# Patient Record
Sex: FEMALE | Race: WHITE | Employment: FULL TIME | URBAN - NONMETROPOLITAN AREA
[De-identification: names, ages, dates, MRNs, and addresses within clinical notes are randomized per-mention and may not be internally consistent; named-entity substitution may affect disease eponyms.]

---

## 2018-07-05 ENCOUNTER — OFFICE VISIT (OUTPATIENT)
Dept: PRIMARY CARE CLINIC | Age: 28
End: 2018-07-05
Payer: COMMERCIAL

## 2018-07-05 VITALS
WEIGHT: 145 LBS | DIASTOLIC BLOOD PRESSURE: 70 MMHG | SYSTOLIC BLOOD PRESSURE: 110 MMHG | HEIGHT: 66 IN | HEART RATE: 94 BPM | BODY MASS INDEX: 23.3 KG/M2 | TEMPERATURE: 97.9 F | OXYGEN SATURATION: 98 %
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
## 2018-07-05 DIAGNOSIS — L03.113 CELLULITIS OF ARM, RIGHT: Primary | ICD-10-CM

## 2018-07-05 DIAGNOSIS — S50.861A INSECT BITE OF RIGHT FOREARM, INITIAL ENCOUNTER: ICD-10-CM

## 2018-07-05 PROCEDURE — 99213 OFFICE O/P EST LOW 20 MIN: CPT | Performed by: NURSE PRACTITIONER

## 2018-07-05 RX ORDER — CLINDAMYCIN HYDROCHLORIDE 300 MG/1
300 CAPSULE ORAL 3 TIMES DAILY
Qty: 30 CAPSULE | Refills: 0 | Status: SHIPPED | OUTPATIENT
Start: 2018-07-05 | End: 2018-07-15

## 2018-07-05 RX ORDER — PREDNISONE 10 MG/1
10 TABLET ORAL DAILY
Qty: 7 TABLET | Refills: 0 | Status: SHIPPED | OUTPATIENT
Start: 2018-07-05 | End: 2018-07-12

## 2018-07-05 ASSESSMENT — ENCOUNTER SYMPTOMS: RESPIRATORY NEGATIVE: 1

## 2018-07-05 NOTE — PROGRESS NOTES
Subjective:      Patient ID: Kelly Saleem is a 28 y.o. female. Rash   This is a new problem. The current episode started in the past 7 days. The problem has been gradually worsening since onset. The affected locations include the right arm. The rash is characterized by redness and itchiness. Treatments tried: clindamycin. The treatment provided mild relief. Review of Systems   Constitutional: Negative. Respiratory: Negative. Cardiovascular: Negative. Musculoskeletal: Negative. Skin: Positive for rash and wound. Objective:   Physical Exam   Constitutional: She is oriented to person, place, and time. She appears well-developed. HENT:   Head: Normocephalic and atraumatic. Right Ear: Tympanic membrane, external ear and ear canal normal.   Left Ear: Tympanic membrane, external ear and ear canal normal.   Nose: Nose normal.   Mouth/Throat: Uvula is midline, oropharynx is clear and moist and mucous membranes are normal.   Eyes: Conjunctivae, EOM and lids are normal. Pupils are equal, round, and reactive to light. Neck: Trachea normal and normal range of motion. Cardiovascular: Normal rate, regular rhythm, normal heart sounds and normal pulses. Pulmonary/Chest: Effort normal and breath sounds normal.   Musculoskeletal: Normal range of motion. Neurological: She is alert and oriented to person, place, and time. Skin: Skin is warm, dry and intact. Lesion noted. There is erythema. Vitals:    07/05/18 1756   BP: 110/70   Pulse: 94   Temp: 97.9 °F (36.6 °C)   SpO2: 98%     I have reviewed pertinent family and social history. Assessment:       Diagnosis Orders   1. Cellulitis of arm, right  clindamycin (CLEOCIN) 300 MG capsule    predniSONE (DELTASONE) 10 MG tablet   2. Insect bite of right forearm, initial encounter  clindamycin (CLEOCIN) 300 MG capsule    predniSONE (DELTASONE) 10 MG tablet           Plan:      Epsom salt soaks. Warm compresses.   Use Bactroban topically

## 2018-07-05 NOTE — PATIENT INSTRUCTIONS
scrapes, or other injuries to your skin. Cellulitis most often occurs where there is a break in the skin. · If you get a scrape, cut, mild burn, or bite, wash the wound with clean water as soon as you can to help avoid infection. Don't use hydrogen peroxide or alcohol, which can slow healing. · If you have swelling in your legs (edema), support stockings and good skin care may help prevent leg sores and cellulitis. · Take care of your feet, especially if you have diabetes or other conditions that increase the risk of infection. Wear shoes and socks. Do not go barefoot. If you have athlete's foot or other skin problems on your feet, talk to your doctor about how to treat them. When should you call for help? Call your doctor now or seek immediate medical care if:    · You have signs that your infection is getting worse, such as:  ¨ Increased pain, swelling, warmth, or redness. ¨ Red streaks leading from the area. ¨ Pus draining from the area. ¨ A fever.     · You get a rash.    Watch closely for changes in your health, and be sure to contact your doctor if:    · You do not get better as expected. Where can you learn more? Go to https://[employer]. org and sign in to your [employer] account. Enter Y796 in the [facility] box to learn more about \"Cellulitis: Care Instructions. \"     If you do not have an account, please click on the \"Sign Up Now\" link. Current as of: May 10, 2017  Content Version: 11.6  © 3273-2519 [employer]. Care instructions adapted under license by [facility] ([facility]). If you have questions about a medical condition or this instruction, always ask your healthcare professional. [place] [address] any warranty or liability for your use of this information.        Patient Education        Insect Stings and Bites: Care Instructions  Your Care Instructions  Stings and bites from bees, wasps, ants, and other insects often cause pain, swelling, redness, and itching. In some people, especially children, the redness and swelling may be worse. It may extend several inches beyond the affected area. But in most cases, stings and bites don't cause reactions all over the body. If you have had a reaction to an insect sting or bite, you are at risk for a reaction if you get stung or bitten again. Follow-up care is a key part of your treatment and safety. Be sure to make and go to all appointments, and call your doctor if you are having problems. It's also a good idea to know your test results and keep a list of the medicines you take. How can you care for yourself at home? · Do not scratch or rub the skin where the sting or bite occurred. · Put a cold pack or ice cube on the area. Put a thin cloth between the ice and your skin. For some people, a paste of baking soda mixed with a little water helps relieve pain and decrease the reaction. · Take an over-the-counter antihistamine, such as diphenhydramine (Benadryl) or loratadine (Claritin), to relieve swelling, redness, and itching. Calamine lotion or hydrocortisone cream may also help. Do not give antihistamines to your child unless you have checked with the doctor first.  · Be safe with medicines. If your doctor prescribed medicine for your allergy, take it exactly as prescribed. Call your doctor if you think you are having a problem with your medicine. You will get more details on the specific medicines your doctor prescribes. · Your doctor may prescribe a shot of epinephrine to carry with you in case you have a severe reaction. Learn how and when to give yourself the shot, and keep it with you at all times. Make sure it has not . · Go to the emergency room anytime you have a severe reaction. Go even if you have given yourself epinephrine and are feeling better. Symptoms can come back. When should you call for help? Call 911 anytime you think you may need emergency care.  For example, call if:

## 2018-07-09 ENCOUNTER — TELEPHONE (OUTPATIENT)
Dept: FAMILY MEDICINE CLINIC | Age: 28
End: 2018-07-09

## 2018-07-09 NOTE — TELEPHONE ENCOUNTER
Not usure if she was at [employer] during that time but is a huge possbility and is requesting any labs that may verify before she goes and gets any immunizations.  Stated that they had a family cookout this past weekend with all her nieces and nephews    Please advise

## 2018-07-10 NOTE — TELEPHONE ENCOUNTER
The only labs that can verify exposure to Hep A would not show up as positive until an exposed individual was acutely ill. That would be too late to have the immunization. There is no other testing that is recommended. If she was possibly exposed I would strongly recommend the immunization. She is very unlikely to have infected any other family members if she herself is not ill even if she had been exposed.

## 2018-09-06 ENCOUNTER — OFFICE VISIT (OUTPATIENT)
Dept: FAMILY MEDICINE CLINIC | Age: 28
End: 2018-09-06
Payer: COMMERCIAL

## 2018-09-06 VITALS
HEART RATE: 80 BPM | SYSTOLIC BLOOD PRESSURE: 104 MMHG | DIASTOLIC BLOOD PRESSURE: 62 MMHG | BODY MASS INDEX: 22.92 KG/M2 | WEIGHT: 142 LBS

## 2018-09-06 DIAGNOSIS — Z13.1 ENCOUNTER FOR SCREENING EXAMINATION FOR IMPAIRED GLUCOSE REGULATION AND DIABETES MELLITUS: ICD-10-CM

## 2018-09-06 DIAGNOSIS — Z13.220 SCREENING FOR LIPID DISORDERS: ICD-10-CM

## 2018-09-06 DIAGNOSIS — Z00.00 WELL ADULT EXAM: Primary | ICD-10-CM

## 2018-09-06 LAB
CHOLESTEROL/HDL RATIO: 2.4 RATIO
CHOLESTEROL: 182 MG/DL
GLUCOSE: 88 MG/DL
HDL, DIRECT: 75 MG/DL
LDL CHOLESTEROL CALCULATED: 89.6 MG/DL
TRIGL SERPL-MCNC: 87 MG/DL
VLDLC SERPL CALC-MCNC: 17 MG/DL

## 2018-09-06 PROCEDURE — 99395 PREV VISIT EST AGE 18-39: CPT | Performed by: FAMILY MEDICINE

## 2018-09-06 ASSESSMENT — PATIENT HEALTH QUESTIONNAIRE - PHQ9
2. FEELING DOWN, DEPRESSED OR HOPELESS: 0
1. LITTLE INTEREST OR PLEASURE IN DOING THINGS: 0
SUM OF ALL RESPONSES TO PHQ QUESTIONS 1-9: 0
SUM OF ALL RESPONSES TO PHQ QUESTIONS 1-9: 0
SUM OF ALL RESPONSES TO PHQ9 QUESTIONS 1 & 2: 0

## 2018-09-06 NOTE — PROGRESS NOTES
11/07/2004    DTaP/Tdap/Td vaccine (1 - Tdap) 11/07/2008    Cervical cancer screen  11/07/2010    Flu vaccine (1) 09/01/2018       Subjective:      Review of Systems    Objective:     /62   Pulse 80   Wt 142 lb (64.4 kg)   BMI 22.92 kg/m²     Physical Exam   Constitutional: She is oriented to person, place, and time. She appears well-developed and well-nourished. HENT:   Head: Normocephalic and atraumatic. Eyes: Conjunctivae and EOM are normal.   Neck: Normal range of motion. Neck supple. No JVD present. No thyromegaly present. Cardiovascular: Normal rate, regular rhythm and intact distal pulses. Exam reveals no gallop and no friction rub. No murmur heard. Pulmonary/Chest: Effort normal and breath sounds normal. No respiratory distress. Abdominal: Soft. She exhibits no distension and no mass. There is no tenderness. There is no rebound and no guarding. Musculoskeletal: She exhibits no edema or deformity. Lymphadenopathy:     She has no cervical adenopathy. Neurological: She is alert and oriented to person, place, and time. Skin: Skin is warm. Psychiatric: She has a normal mood and affect. Her behavior is normal. Judgment and thought content normal.   Nursing note and vitals reviewed. Assessment/Plan:      Diagnosis Orders   1. Well adult exam     2. Screening for lipid disorders  Lipid Panel   3. Encounter for screening examination for impaired glucose regulation and diabetes mellitus  Glucose, Fasting     Reviewed routine health maintenance today. Encouraged to continue on her diet and exercise. Sunscreen seatbelts and home safety also reviewed today. Encouraged adequate dietary calcium with supplementation if not able to achieve. Return in about 1 year (around 9/6/2019). Patient given educational materials - see patient instructions. Discussed use, benefit, and side effects of prescribed medications. All patient questions answered. Pt voiced understanding.

## 2020-05-06 LAB — GYNECOLOGY CYTOLOGY REPORT: NORMAL

## 2020-10-08 ENCOUNTER — HOSPITAL ENCOUNTER (OUTPATIENT)
Age: 30
Setting detail: SPECIMEN
Discharge: HOME | End: 2020-10-08
Payer: COMMERCIAL

## 2020-10-08 ENCOUNTER — OFFICE VISIT (OUTPATIENT)
Dept: FAMILY MEDICINE CLINIC | Age: 30
End: 2020-10-08
Payer: COMMERCIAL

## 2020-10-08 VITALS
HEART RATE: 97 BPM | DIASTOLIC BLOOD PRESSURE: 60 MMHG | WEIGHT: 160 LBS | SYSTOLIC BLOOD PRESSURE: 104 MMHG | OXYGEN SATURATION: 97 % | BODY MASS INDEX: 25.11 KG/M2 | HEIGHT: 67 IN

## 2020-10-08 LAB — GLUCOSE FASTING: 85 MG/DL (ref 70–99)

## 2020-10-08 PROCEDURE — 82947 ASSAY GLUCOSE BLOOD QUANT: CPT

## 2020-10-08 PROCEDURE — 99395 PREV VISIT EST AGE 18-39: CPT | Performed by: FAMILY MEDICINE

## 2020-10-08 PROCEDURE — 36415 COLL VENOUS BLD VENIPUNCTURE: CPT

## 2020-10-08 PROCEDURE — 80061 LIPID PANEL: CPT

## 2020-10-08 RX ORDER — VITAMIN A, VITAMIN C, VITAMIN D-3, VITAMIN E, VITAMIN B-1, VITAMIN B-2, NIACIN, VITAMIN B-6, CALCIUM, IRON, ZINC, COPPER 4000; 120; 400; 22; 1.84; 3; 20; 10; 1; 12; 200; 27; 25; 2 [IU]/1; MG/1; [IU]/1; MG/1; MG/1; MG/1; MG/1; MG/1; MG/1; UG/1; MG/1; MG/1; MG/1; MG/1
1 TABLET ORAL DAILY
COMMUNITY
Start: 2020-09-29

## 2020-10-08 ASSESSMENT — PATIENT HEALTH QUESTIONNAIRE - PHQ9
SUM OF ALL RESPONSES TO PHQ9 QUESTIONS 1 & 2: 0
1. LITTLE INTEREST OR PLEASURE IN DOING THINGS: 0
SUM OF ALL RESPONSES TO PHQ QUESTIONS 1-9: 0
2. FEELING DOWN, DEPRESSED OR HOPELESS: 0
SUM OF ALL RESPONSES TO PHQ QUESTIONS 1-9: 0

## 2020-10-08 NOTE — PROGRESS NOTES
[address] [place] [address] [address]  Dept: [phone]  Dept KD:[phone]    Maurice Hilliard is a 26 y.o. female who presents today for her medical conditions/complaints as notedbelow. Maurice Hilliard is c/o of Annual Exam (physical for insurnace)      HPI:     HPI  Feels good. Has no significant concerns. Does exercise regularly. She is watching her diet regularly. She is working from home most of the time. She is only taking prenatal vitamin at this time as she and her  are attempting to conceive a child. Her immunizations are up-to-date. Family history reviewed and updated also. BP Readings from Last 3 Encounters:   10/08/20 104/60   09/06/18 104/62   07/05/18 110/70          (goal 120/80)    Wt Readings from Last 3 Encounters:   10/08/20 160 lb (72.6 kg)   09/06/18 142 lb (64.4 kg)   07/05/18 145 lb (65.8 kg)        Past Medical History:   Diagnosis Date    History of UTI     sees Dr Aba Roy      Past Surgical History:   Procedure Laterality Date    TONSILLECTOMY         Family History   Problem Relation Age of Onset    Other Mother         partial hysterectomy - does not know why    Other Father         heart issues - SVT    Breast Cancer Maternal Grandmother     Cancer Maternal Grandmother         skin    Other Paternal Grandmother         two valves replaced    Atrial Fibrillation Paternal Grandfather        Social History     Tobacco Use    Smoking status: Never Smoker    Smokeless tobacco: Never Used   Substance Use Topics    Alcohol use: Yes     Alcohol/week: 0.0 standard drinks     Comment: socially      Current Outpatient Medications   Medication Sig Dispense Refill    Prenatal Vit-Fe Fumarate-FA (PRENATAL VITAMIN PLUS LOW IRON) 27-1 MG TABS Take 1 tablet by mouth daily       No current facility-administered medications for this visit.       No Known Allergies    Health Maintenance   Topic Date Due    HIV screen 11/07/2004    DTaP/Tdap/Td vaccine (7 - Td) 03/28/2018    Flu vaccine (1) 09/01/2020    Cervical cancer screen  04/30/2023    Hepatitis B vaccine  Completed    Hib vaccine  Completed    Meningococcal (ACWY) vaccine  Completed    Hepatitis A vaccine  Aged Out    Pneumococcal 0-64 years Vaccine  Aged Out    Varicella vaccine  Discontinued       Subjective:      Review of Systems    Objective:     /60 (Site: Left Upper Arm, Position: Sitting, Cuff Size: Medium Adult)   Pulse 97   Ht 5' 6.75\" (1.695 m)   Wt 160 lb (72.6 kg)   LMP 10/04/2020   SpO2 97%   BMI 25.25 kg/m²     Physical Exam  Vitals signs and nursing note reviewed. Constitutional:       Appearance: She is well-developed. HENT:      Head: Normocephalic and atraumatic. Right Ear: Tympanic membrane normal.      Left Ear: Tympanic membrane normal.      Nose: Nose normal.      Mouth/Throat:      Mouth: Mucous membranes are moist.   Eyes:      Conjunctiva/sclera: Conjunctivae normal.   Neck:      Musculoskeletal: Normal range of motion and neck supple. Thyroid: No thyromegaly. Vascular: No JVD. Cardiovascular:      Rate and Rhythm: Normal rate and regular rhythm. Heart sounds: No murmur. No friction rub. No gallop. Pulmonary:      Effort: Pulmonary effort is normal. No respiratory distress. Breath sounds: Normal breath sounds. Abdominal:      Palpations: Abdomen is soft. Lymphadenopathy:      Cervical: No cervical adenopathy. Skin:     General: Skin is warm. Neurological:      Mental Status: She is alert and oriented to person, place, and time. Psychiatric:         Mood and Affect: Mood normal.         Behavior: Behavior normal.         Thought Content: Thought content normal.         Judgment: Judgment normal.         Assessment/Plan:      Diagnosis Orders   1. Well adult exam     2. Encounter for screening examination for impaired glucose regulation and diabetes mellitus  Glucose, Fasting   3. Screening, lipid  Lipid Panel     Reviewed routine health maintenance today. Encouraged to continue on her diet and exercise. Sunscreen seatbelts and home safety also reviewed today. Encouraged adequate dietary calcium with supplementation if not able to achieve. Lab Results   Component Value Date    CHOL 205 (H) 10/08/2020    TRIG 97 10/08/2020    HDL 65 10/08/2020    GLUF 85 10/08/2020       Return in about 1 year (around 10/8/2021) for Well adult. Patient given educational materials - see patientinstructions. Discussed use, benefit, and side effects of prescribed medications. All patient questions answered. Pt voiced understanding. Reviewed health maintenance. Instructed to continue current medications, diet andexercise. Patient agreed with treatment plan. Follow up as directed.      (Please note that portions of this note were completed with a voice-recognition program. Efforts were made to edit the dictation but occasionally words are mis-transcribed.)    Electronically signed by Shefali George MD on 10/11/2020

## 2020-10-09 LAB
CHOLESTEROL/HDL RATIO: 3.2
CHOLESTEROL: 205 MG/DL
HDLC SERPL-MCNC: 65 MG/DL
LDL CHOLESTEROL: 121 MG/DL (ref 0–130)
TRIGL SERPL-MCNC: 97 MG/DL
VLDLC SERPL CALC-MCNC: ABNORMAL MG/DL (ref 1–30)

## 2021-04-06 LAB — HCG QUANTITATIVE: 152 MUNIT/ML (ref 0–5)

## 2021-04-08 LAB — HCG QUANTITATIVE: 76.7 MUNIT/ML (ref 0–5)

## 2021-04-15 LAB — HCG QUANTITATIVE: 32.8 MUNIT/ML (ref 0–5)

## 2021-05-03 LAB
HCG QUALITATIVE: POSITIVE
HCG QUANTITATIVE: 24.6 MUNIT/ML (ref 0–5)
PROLACTIN: 18.12 UG/L (ref 4.79–23.3)
TSH REFLEX FT4: 1.53 MIU/ML (ref 0.49–4.67)

## 2021-05-10 LAB — HCG QUANTITATIVE: 10.5 MUNIT/ML (ref 0–5)

## 2021-05-17 LAB — HCG QUANTITATIVE: 5.4 MUNIT/ML (ref 0–5)

## 2022-07-12 ENCOUNTER — TELEPHONE (OUTPATIENT)
Dept: FAMILY MEDICINE CLINIC | Age: 32
End: 2022-07-12

## 2022-07-12 NOTE — TELEPHONE ENCOUNTER
Patient notified yes can schedule  appointment. Advised to call office with baby is born and we will schedule appointment.

## 2022-07-12 NOTE — TELEPHONE ENCOUNTER
----- Message from Grace Montalvo sent at 2022  4:39 PM EDT -----  Subject: Message to Provider    QUESTIONS  Information for Provider? Pt is calling to see if Dr. Sosa Jara would take   her  on as a new pt-she is due tin August. Please contact pt.   ---------------------------------------------------------------------------  --------------  Melinda GARCIA  [phone]; OK to leave message on voicemail  ---------------------------------------------------------------------------  --------------  SCRIPT ANSWERS  Relationship to Patient?  Self

## 2022-08-19 LAB
ALBUMIN/GLOBULIN RATIO: 1.1 G/DL
ALBUMIN: 3.7 G/DL (ref 3.5–5)
ALP BLD-CCNC: 173 UNITS/L (ref 38–126)
ALT SERPL-CCNC: 285 UNITS/L (ref 4–35)
ANION GAP SERPL CALCULATED.3IONS-SCNC: 9.1 MMOL/L
AST SERPL-CCNC: 274 UNITS/L (ref 14–36)
BASOPHILS %: 0.87 (ref 0–3)
BASOPHILS ABSOLUTE: 0.13 (ref 0–0.3)
BILIRUB SERPL-MCNC: 0.4 MG/DL (ref 0.2–1.3)
BUN BLDV-MCNC: 12 MG/DL (ref 7–17)
CALCIUM SERPL-MCNC: 9.2 MG/DL (ref 8.4–10.2)
CHLORIDE BLD-SCNC: 105 MMOL/L (ref 98–120)
CO2: 23 MMOL/L (ref 22–31)
CREAT SERPL-MCNC: 0.6 MG/DL (ref 0.5–1)
EOSINOPHILS %: 0.12 (ref 0–10)
EOSINOPHILS ABSOLUTE: 0.02 (ref 0–1.1)
GFR CALCULATED: > 60
GLOBULIN: 3.4 G/DL
GLUCOSE: 124 MG/DL (ref 65–105)
HCT VFR BLD CALC: 37.1 % (ref 37–47)
HEMOGLOBIN: 11.3 (ref 12–16)
LACTATE DEHYDROGENASE: 913 UNITS/L (ref 313–618)
LYMPHOCYTE %: 12.64 (ref 20–51.1)
LYMPHOCYTES ABSOLUTE: 1.85 (ref 1–5.5)
MCH RBC QN AUTO: 29.5 PG (ref 28.5–32.5)
MCHC RBC AUTO-ENTMCNC: 30.5 G/DL (ref 32–37)
MCV RBC AUTO: 96.8 FL (ref 80–94)
MONOCYTES %: 3.89 (ref 1.7–9.3)
MONOCYTES ABSOLUTE: 0.57 (ref 0.1–1)
NEUTROPHILS %: 82.48 (ref 42.2–75.2)
NEUTROPHILS ABSOLUTE: 12.06 (ref 2–8.1)
PDW BLD-RTO: 12 % (ref 8.5–15.5)
PLATELET # BLD: 120.3 THOU/MM3 (ref 130–400)
POTASSIUM SERPL-SCNC: 4.1 MMOL/L (ref 3.6–5)
RBC: 3.83 M/UL (ref 4.2–5.4)
SODIUM BLD-SCNC: 133 MMOL/L (ref 135–145)
TOTAL PROTEIN, SERUM: 7.1 G/DL (ref 6.3–8.2)
URIC ACID: 4.7 MG/DL (ref 3.5–8.5)
WBC: 14.6 THOU/ML3 (ref 4.8–10.8)

## 2022-08-26 ENCOUNTER — NURSE ONLY (OUTPATIENT)
Dept: FAMILY MEDICINE CLINIC | Age: 32
End: 2022-08-26

## 2022-08-26 VITALS — SYSTOLIC BLOOD PRESSURE: 138 MMHG | DIASTOLIC BLOOD PRESSURE: 88 MMHG

## 2022-08-26 DIAGNOSIS — I10 HYPERTENSION, UNSPECIFIED TYPE: Primary | ICD-10-CM

## 2022-08-26 PROCEDURE — 99211 OFF/OP EST MAY X REQ PHY/QHP: CPT | Performed by: FAMILY MEDICINE

## 2022-10-03 ENCOUNTER — OFFICE VISIT (OUTPATIENT)
Dept: FAMILY MEDICINE CLINIC | Age: 32
End: 2022-10-03
Payer: COMMERCIAL

## 2022-10-03 VITALS
HEART RATE: 82 BPM | HEIGHT: 67 IN | SYSTOLIC BLOOD PRESSURE: 98 MMHG | WEIGHT: 155 LBS | DIASTOLIC BLOOD PRESSURE: 62 MMHG | BODY MASS INDEX: 24.33 KG/M2 | OXYGEN SATURATION: 98 %

## 2022-10-03 DIAGNOSIS — B07.0 PLANTAR WART: Primary | ICD-10-CM

## 2022-10-03 PROCEDURE — 17110 DESTRUCTION B9 LES UP TO 14: CPT | Performed by: FAMILY MEDICINE

## 2022-10-03 PROCEDURE — 99213 OFFICE O/P EST LOW 20 MIN: CPT | Performed by: FAMILY MEDICINE

## 2022-10-03 RX ORDER — LIDOCAINE HYDROCHLORIDE 10 MG/ML
2 INJECTION, SOLUTION INFILTRATION; PERINEURAL ONCE
Status: COMPLETED | OUTPATIENT
Start: 2022-10-03 | End: 2022-10-03

## 2022-10-03 RX ADMIN — LIDOCAINE HYDROCHLORIDE 2 ML: 10 INJECTION, SOLUTION INFILTRATION; PERINEURAL at 15:23

## 2022-10-03 SDOH — ECONOMIC STABILITY: FOOD INSECURITY: WITHIN THE PAST 12 MONTHS, YOU WORRIED THAT YOUR FOOD WOULD RUN OUT BEFORE YOU GOT MONEY TO BUY MORE.: NEVER TRUE

## 2022-10-03 SDOH — ECONOMIC STABILITY: TRANSPORTATION INSECURITY
IN THE PAST 12 MONTHS, HAS THE LACK OF TRANSPORTATION KEPT YOU FROM MEDICAL APPOINTMENTS OR FROM GETTING MEDICATIONS?: NO

## 2022-10-03 SDOH — ECONOMIC STABILITY: FOOD INSECURITY: WITHIN THE PAST 12 MONTHS, THE FOOD YOU BOUGHT JUST DIDN'T LAST AND YOU DIDN'T HAVE MONEY TO GET MORE.: NEVER TRUE

## 2022-10-03 SDOH — ECONOMIC STABILITY: TRANSPORTATION INSECURITY
IN THE PAST 12 MONTHS, HAS LACK OF TRANSPORTATION KEPT YOU FROM MEETINGS, WORK, OR FROM GETTING THINGS NEEDED FOR DAILY LIVING?: NO

## 2022-10-03 ASSESSMENT — PATIENT HEALTH QUESTIONNAIRE - PHQ9
SUM OF ALL RESPONSES TO PHQ QUESTIONS 1-9: 0
SUM OF ALL RESPONSES TO PHQ9 QUESTIONS 1 & 2: 0
SUM OF ALL RESPONSES TO PHQ QUESTIONS 1-9: 0
1. LITTLE INTEREST OR PLEASURE IN DOING THINGS: 0
SUM OF ALL RESPONSES TO PHQ QUESTIONS 1-9: 0
2. FEELING DOWN, DEPRESSED OR HOPELESS: 0
SUM OF ALL RESPONSES TO PHQ QUESTIONS 1-9: 0

## 2022-10-03 ASSESSMENT — SOCIAL DETERMINANTS OF HEALTH (SDOH): HOW HARD IS IT FOR YOU TO PAY FOR THE VERY BASICS LIKE FOOD, HOUSING, MEDICAL CARE, AND HEATING?: NOT HARD AT ALL

## 2022-10-03 NOTE — PROGRESS NOTES
[address] [facility]  [address]  Dept: [phone]  Dept BSR:[phone]    Blue Stubbs is a 27 y.o. female who presents today for her medical conditions/complaints as notedbelow. Blue Stubbs is c/o of Verruca Vulgaris (Warts on right great toe- have been present for awhile but it is now starting to bother her. )        Assessment/Plan:     1. Plantar wart  -     lidocaine 1 % injection 2 mL; 2 mL, IntraDERmal, ONCE, 1 dose, On Mon 10/3/22 at 1545      Lab Results   Component Value Date    WBC 12.2 (H) 08/22/2022    HGB 12.4 08/22/2022    HCT 37.3 08/22/2022    PLT 97.75 (L) 08/22/2022    CHOL 205 (H) 10/08/2020    TRIG 97 10/08/2020    HDL 65 10/08/2020     (H) 08/22/2022     (H) 08/22/2022     (L) 08/22/2022    K 4.5 08/22/2022     08/22/2022    CREATININE 0.6 08/22/2022    BUN 11 08/22/2022    CO2 24 08/22/2022    INR 1.0 08/22/2022    GLUF 85 10/08/2020       Return if symptoms worsen or fail to improve. Subjective:      HPI:     HPI    Had several warts on the right great toe for at least the last 6 months. Has tried multiple over-the-counter therapies without relief. There are certain really bother her when she walks. Really bothers when she wears sandals or flip-flops. No bleeding.       BP Readings from Last 3 Encounters:   10/03/22 98/62   08/26/22 138/88   10/08/20 104/60          (goal 120/80)    Wt Readings from Last 3 Encounters:   10/03/22 155 lb (70.3 kg)   10/08/20 160 lb (72.6 kg)   09/06/18 142 lb (64.4 kg)        Past Medical History:   Diagnosis Date    History of UTI     sees Dr Nancy Choi      Past Surgical History:   Procedure Laterality Date    TONSILLECTOMY         Family History   Problem Relation Age of Onset    Other Mother         partial hysterectomy - does not know why    Other Father         heart issues - SVT    Breast Cancer Maternal Grandmother     Cancer Maternal Grandmother skin    Other Paternal Grandmother         two valves replaced    Atrial Fibrillation Paternal Grandfather        Social History     Tobacco Use    Smoking status: Never    Smokeless tobacco: Never   Substance Use Topics    Alcohol use: Yes     Alcohol/week: 0.0 standard drinks     Comment: socially      Current Outpatient Medications   Medication Sig Dispense Refill    Prenatal Vit-Fe Fumarate-FA (PRENATAL VITAMIN PLUS LOW IRON) 27-1 MG TABS Take 1 tablet by mouth daily       Current Facility-Administered Medications   Medication Dose Route Frequency Provider Last Rate Last Admin    lidocaine 1 % injection 2 mL  2 mL IntraDERmal Once Alec Quintero MD         No Known Allergies    Health Maintenance   Topic Date Due    Depression Screen  Never done    DTaP/Tdap/Td vaccine (7 - Td or Tdap) 03/28/2018    COVID-19 Vaccine (3 - Booster for Moderna series) 10/18/2021    Flu vaccine (1) 08/01/2022    Cervical cancer screen  04/30/2023    Hepatitis B vaccine  Completed    Hib vaccine  Completed    Meningococcal (ACWY) vaccine  Completed    Hepatitis C screen  Completed    HIV screen  Completed    Hepatitis A vaccine  Aged Out    Pneumococcal 0-64 years Vaccine  Aged Out    Varicella vaccine  Discontinued         Review of Systems    Objective:     BP 98/62 (Site: Right Upper Arm, Position: Sitting, Cuff Size: Medium Adult)   Pulse 82   Ht 5' 6.75\" (1.695 m)   Wt 155 lb (70.3 kg)   SpO2 98%   Breastfeeding Yes   BMI 24.46 kg/m²     Physical Exam  Vitals and nursing note reviewed. Musculoskeletal:        Feet:      PROCEDURE:  Informed consent reviewed including risk of infection, bleeding pain scar and recurance. Treatment alternatives also reviewed with the patient. Betadine and alcohol prep to each lesion. Anesthesia with 1% lidocaine without epi. 15 blade used to remove thick callous to pinpoint bleeding. Base currettaged with the surgitron and bleeding controlled similarly. Patient tolerated well. Reviewed wound care, time to healing and s/sx of infection to warrant concern        Multiple labs and other testing may have been ordered which may not be completely evident from the above note due to system interface incompatibilities. Patient given educational materials - see patientinstructions. Discussed use, benefit, and side effects of prescribed medications. All patient questions answered. Pt voiced understanding. Reviewed health maintenance. Instructed to continue current medications, diet andexercise. Patient agreed with treatment plan. Follow up as directed.      (Please note that portions of this note were completed with a voice-recognition program. Efforts were made to edit the dictation but occasionally words are mis-transcribed.)    Electronically signed by Alice Haddad MD on 10/3/2022

## 2023-05-19 ENCOUNTER — HOSPITAL ENCOUNTER (OUTPATIENT)
Age: 33
Setting detail: SPECIMEN
Discharge: HOME | End: 2023-05-19
Payer: COMMERCIAL

## 2023-05-19 ENCOUNTER — OFFICE VISIT (OUTPATIENT)
Dept: FAMILY MEDICINE CLINIC | Age: 33
End: 2023-05-19
Payer: COMMERCIAL

## 2023-05-19 VITALS
TEMPERATURE: 98.4 F | WEIGHT: 149 LBS | SYSTOLIC BLOOD PRESSURE: 96 MMHG | RESPIRATION RATE: 16 BRPM | DIASTOLIC BLOOD PRESSURE: 60 MMHG | OXYGEN SATURATION: 99 % | HEART RATE: 78 BPM | BODY MASS INDEX: 23.51 KG/M2

## 2023-05-19 DIAGNOSIS — L03.012 CELLULITIS OF FINGER OF LEFT HAND: ICD-10-CM

## 2023-05-19 DIAGNOSIS — B07.9 VERRUCA VULGARIS: ICD-10-CM

## 2023-05-19 DIAGNOSIS — L03.114 CELLULITIS OF HAND, LEFT: Primary | ICD-10-CM

## 2023-05-19 PROCEDURE — 99204 OFFICE O/P NEW MOD 45 MIN: CPT | Performed by: NURSE PRACTITIONER

## 2023-05-19 PROCEDURE — 87070 CULTURE OTHR SPECIMN AEROBIC: CPT

## 2023-05-19 PROCEDURE — 87205 SMEAR GRAM STAIN: CPT

## 2023-05-19 PROCEDURE — 11056 PARNG/CUTG B9 HYPRKR LES 2-4: CPT | Performed by: NURSE PRACTITIONER

## 2023-05-19 RX ORDER — DOXYCYCLINE HYCLATE 100 MG
100 TABLET ORAL 2 TIMES DAILY
Qty: 20 TABLET | Refills: 0 | Status: SHIPPED | OUTPATIENT
Start: 2023-05-19 | End: 2023-05-29

## 2023-05-19 SDOH — ECONOMIC STABILITY: FOOD INSECURITY: WITHIN THE PAST 12 MONTHS, THE FOOD YOU BOUGHT JUST DIDN'T LAST AND YOU DIDN'T HAVE MONEY TO GET MORE.: NEVER TRUE

## 2023-05-19 SDOH — ECONOMIC STABILITY: INCOME INSECURITY: HOW HARD IS IT FOR YOU TO PAY FOR THE VERY BASICS LIKE FOOD, HOUSING, MEDICAL CARE, AND HEATING?: NOT HARD AT ALL

## 2023-05-19 SDOH — ECONOMIC STABILITY: HOUSING INSECURITY
IN THE LAST 12 MONTHS, WAS THERE A TIME WHEN YOU DID NOT HAVE A STEADY PLACE TO SLEEP OR SLEPT IN A SHELTER (INCLUDING NOW)?: NO

## 2023-05-19 SDOH — ECONOMIC STABILITY: FOOD INSECURITY: WITHIN THE PAST 12 MONTHS, YOU WORRIED THAT YOUR FOOD WOULD RUN OUT BEFORE YOU GOT MONEY TO BUY MORE.: NEVER TRUE

## 2023-05-19 ASSESSMENT — PATIENT HEALTH QUESTIONNAIRE - PHQ9
1. LITTLE INTEREST OR PLEASURE IN DOING THINGS: 0
SUM OF ALL RESPONSES TO PHQ QUESTIONS 1-9: 0
SUM OF ALL RESPONSES TO PHQ QUESTIONS 1-9: 0
SUM OF ALL RESPONSES TO PHQ9 QUESTIONS 1 & 2: 0
2. FEELING DOWN, DEPRESSED OR HOPELESS: 0
SUM OF ALL RESPONSES TO PHQ QUESTIONS 1-9: 0
SUM OF ALL RESPONSES TO PHQ QUESTIONS 1-9: 0

## 2023-05-19 NOTE — PROGRESS NOTES
is 23.51 kg/m². BP 96/60 (Site: Right Upper Arm, Position: Sitting, Cuff Size: Medium Adult)   Pulse 78   Temp 98.4 °F (36.9 °C) (Tympanic)   Resp 16   Wt 149 lb (67.6 kg)   LMP 05/13/2023 (Exact Date)   SpO2 99%   BMI 23.51 kg/m²   Physical Exam  Vitals and nursing note reviewed. Constitutional:       General: She is not in acute distress. Appearance: Normal appearance. She is normal weight. She is not ill-appearing. Musculoskeletal:         General: Tenderness and signs of injury present. Left hand: Swelling and tenderness present. Normal strength. Normal sensation. There is no disruption of two-point discrimination. Normal capillary refill. Normal pulse. Hands:         Feet:       Comments: Area of redness, warmth and tenderness. Appears slightly edematous also. Movement and sensation intact with brisk cap refill.'    Ring finger lesion was lanced using a 23 gauge needle after being cleansed with alcohol and use of ethyl chloride cold spray and small amount of purulent drainage obtained for culture. Site covered with bandaid. Feet:      Left foot:      Skin integrity: Callus (area surrounding plantar wart slightly more calloused.) present. No ulcer, skin breakdown or erythema. Comments: Small re occurrent plantar wart to plantar region of right great toe x 2 small areas. Was previously removed with surgical blade and cautery. Skin:     Capillary Refill: Capillary refill takes less than 2 seconds. Findings: Erythema and lesion present. Neurological:      General: No focal deficit present. Mental Status: She is alert. Discussed exam, POCT findings, plan of care, and follow-up at length with patient and/or their caregiver. Reviewed all prescribed and recommended medications, administration and side effects. Encouraged patient to follow up with PCP or return to the clinic for no improvement and or worsening of symptoms.  All questions were addressed

## 2023-05-21 LAB
MICROORGANISM SPEC CULT: ABNORMAL
MICROORGANISM/AGENT SPEC: ABNORMAL
MICROORGANISM/AGENT SPEC: ABNORMAL
SERVICE CMNT-IMP: ABNORMAL
SPECIMEN DESCRIPTION: ABNORMAL

## 2024-02-20 LAB — HCG QUANTITATIVE: 117.4 MUNIT/ML (ref 0–5)

## 2024-02-22 LAB — HCG QUANTITATIVE: 154.6 MUNIT/ML (ref 0–5)

## 2024-02-26 LAB — HCG QUANTITATIVE: 226.5 MUNIT/ML (ref 0–5)

## 2024-03-04 LAB — HCG QUANTITATIVE: 346.1 MUNIT/ML (ref 0–5)

## 2024-03-05 LAB
ALBUMIN/GLOBULIN RATIO: 2.04 G/DL
ALBUMIN: 5.1 G/DL (ref 3.5–5)
ALP BLD-CCNC: 71 UNITS/L (ref 38–126)
ALT SERPL-CCNC: 16 UNITS/L (ref 4–35)
ANION GAP SERPL CALCULATED.3IONS-SCNC: 6.3 MMOL/L (ref 3–11)
APTT: 30.5 SEC (ref 22–32)
AST SERPL-CCNC: 27 UNITS/L (ref 14–36)
BACTERIA, URINE: NORMAL /HPF
BASOPHILS %: 1.5 (ref 0–3)
BASOPHILS ABSOLUTE: 0.09 (ref 0–0.3)
BILIRUB SERPL-MCNC: 0.4 MG/DL (ref 0.2–1.3)
BILIRUBIN URINE: NEGATIVE
BLOOD, URINE: NORMAL
BUN BLDV-MCNC: 17 MG/DL (ref 7–17)
CALCIUM SERPL-MCNC: 10 MG/DL (ref 8.4–10.2)
CASTS UA: NORMAL /LPF
CHLORIDE BLD-SCNC: 105 MMOL/L (ref 98–120)
CLARITY: CLEAR
CO2: 28 MMOL/L (ref 22–31)
COLOR, URINE: YELLOW
CREAT SERPL-MCNC: 0.7 MG/DL (ref 0.5–1)
CRYSTALS, UA: NORMAL
EOSINOPHILS %: 1.47 (ref 0–10)
EOSINOPHILS ABSOLUTE: 0.09 (ref 0–1.1)
GFR CALCULATED: > 60
GLOBULIN: 2.5 G/DL
GLUCOSE URINE: NEGATIVE MG/DL
GLUCOSE: 88 MG/DL (ref 65–105)
HCT VFR BLD CALC: 35.6 % (ref 37–47)
HEMOGLOBIN: 11.8 (ref 12–16)
INR BLD: 1 RATIO (ref 0.8–1.2)
KETONES, URINE: NEGATIVE MG/DL
LEUKOCYTE ESTERASE, URINE: NEGATIVE
LYMPHOCYTE %: 33.01 (ref 20–51.1)
LYMPHOCYTES ABSOLUTE: 2.04 (ref 1–5.5)
MCH RBC QN AUTO: 30.3 PG (ref 28.5–32.5)
MCHC RBC AUTO-ENTMCNC: 33 G/DL (ref 32–37)
MCV RBC AUTO: 91.8 FL (ref 80–94)
MONOCYTES %: 6.16 (ref 1.7–9.3)
MONOCYTES ABSOLUTE: 0.38 (ref 0.1–1)
NEUTROPHILS %: 57.87 (ref 42.2–75.2)
NEUTROPHILS ABSOLUTE: 3.58 (ref 2–8.1)
NITRITE, URINE: NEGATIVE
PDW BLD-RTO: 11.5 % (ref 8.5–15.5)
PH UA: 6 (ref 5–8.5)
PLATELET # BLD: 266.3 THOU/MM3 (ref 130–400)
POTASSIUM SERPL-SCNC: 3.8 MMOL/L (ref 3.6–5)
PROTEIN UA: NEGATIVE MG/DL
PROTHROMBIN TIME: 10.8 SEC (ref 9.3–12.5)
RBC URINE: NORMAL /HPF (ref 0–2)
RBC: 3.88 M/UL (ref 4.2–5.4)
SODIUM BLD-SCNC: 139 MMOL/L (ref 135–145)
SPECIFIC GRAVITY, URINE: 1.01 MG/DL (ref 1–1.03)
SQUAMOUS EPITHELIAL: NORMAL /HPF
TOTAL PROTEIN, SERUM: 7.6 G/DL (ref 6.3–8.2)
UROBILINOGEN, URINE: 0.2 MG/DL (ref 0.2–1)
WBC URINE: NORMAL /HPF (ref 0–4)
WBC: 6.2 THOU/ML3 (ref 4.8–10.8)

## 2024-03-13 LAB
BASOPHILS %: 1.53 (ref 0–3)
BASOPHILS ABSOLUTE: 0.1 (ref 0–0.3)
EOSINOPHILS %: 0.82 (ref 0–10)
EOSINOPHILS ABSOLUTE: 0.05 (ref 0–1.1)
HCG QUANTITATIVE: 12.9 MUNIT/ML (ref 0–5)
HCT VFR BLD CALC: 39.3 % (ref 37–47)
HEMOGLOBIN: 12.9 (ref 12–16)
LYMPHOCYTE %: 24.13 (ref 20–51.1)
LYMPHOCYTES ABSOLUTE: 1.6 (ref 1–5.5)
MCH RBC QN AUTO: 30.5 PG (ref 28.5–32.5)
MCHC RBC AUTO-ENTMCNC: 32.7 G/DL (ref 32–37)
MCV RBC AUTO: 93.2 FL (ref 80–94)
MONOCYTES %: 5.68 (ref 1.7–9.3)
MONOCYTES ABSOLUTE: 0.38 (ref 0.1–1)
NEUTROPHILS %: 67.83 (ref 42.2–75.2)
NEUTROPHILS ABSOLUTE: 4.51 (ref 2–8.1)
PDW BLD-RTO: 12.3 % (ref 8.5–15.5)
PLATELET # BLD: 353.2 THOU/MM3 (ref 130–400)
RBC: 4.21 M/UL (ref 4.2–5.4)
WBC: 6.7 THOU/ML3 (ref 4.8–10.8)